# Patient Record
Sex: FEMALE | Race: WHITE
[De-identification: names, ages, dates, MRNs, and addresses within clinical notes are randomized per-mention and may not be internally consistent; named-entity substitution may affect disease eponyms.]

---

## 2017-11-22 ENCOUNTER — TRANSCRIPTION ENCOUNTER (OUTPATIENT)
Age: 52
End: 2017-11-22

## 2018-05-25 ENCOUNTER — TRANSCRIPTION ENCOUNTER (OUTPATIENT)
Age: 53
End: 2018-05-25

## 2019-12-12 ENCOUNTER — TRANSCRIPTION ENCOUNTER (OUTPATIENT)
Age: 54
End: 2019-12-12

## 2019-12-29 ENCOUNTER — TRANSCRIPTION ENCOUNTER (OUTPATIENT)
Age: 54
End: 2019-12-29

## 2022-04-09 ENCOUNTER — TRANSCRIPTION ENCOUNTER (OUTPATIENT)
Age: 57
End: 2022-04-09

## 2022-09-16 PROBLEM — Z00.00 ENCOUNTER FOR PREVENTIVE HEALTH EXAMINATION: Status: ACTIVE | Noted: 2022-09-16

## 2022-09-27 ENCOUNTER — NON-APPOINTMENT (OUTPATIENT)
Age: 57
End: 2022-09-27

## 2022-09-27 ENCOUNTER — APPOINTMENT (OUTPATIENT)
Dept: SURGERY | Facility: CLINIC | Age: 57
End: 2022-09-27

## 2022-09-27 VITALS
TEMPERATURE: 98.3 F | HEIGHT: 62 IN | WEIGHT: 138 LBS | BODY MASS INDEX: 25.4 KG/M2 | DIASTOLIC BLOOD PRESSURE: 81 MMHG | HEART RATE: 99 BPM | SYSTOLIC BLOOD PRESSURE: 135 MMHG | OXYGEN SATURATION: 98 %

## 2022-09-27 PROCEDURE — 99203 OFFICE O/P NEW LOW 30 MIN: CPT

## 2022-09-27 NOTE — ASSESSMENT
[FreeTextEntry1] : This is a 57-year-old female with a 21-year history of having a hernia near her epigastrium.  She states the hernia.  After her last childbirth.  She has no complaints of pain in the area.  She does state that this is gotten larger over the past 21 years time.  She has no change in bowel habits but she is a chronically constipated person.  She has had no bouts of nausea or vomiting.\par \par Physical examination patient examined erect and supine position.  She has a mild diastases in the upper midline.  She has an obvious hernia above the umbilicus that is approximately the size of a golf ball.  She also has a small hernia at the level of the umbilicus.\par \par Impression/plan chronically incarcerated ventral hernia, umbilical hernia: Is a 57-year-old female with 2 discrete hernias that are fairly close in location.  The hernia is asymptomatic but the larger hernia is gotten significantly larger in the past several years.  She now wish to have this repaired.\par \par The patient myself had a long conversation regarding the various surgical approaches such as robotic laparoscopic and open.  We have also talked about the various types of mesh such as resorbable permanent as well as the placement of the mesh.\par \par Patient will be scheduled for a open repair with mesh and she will require component separation since these 2 hernias are fairly close in location and therefore will be treated as 1 large hernia.  The indications alternatives and complication of the procedure discussed questions have been answered.  Written consent obtained in the office today. 10